# Patient Record
Sex: MALE | Race: WHITE | NOT HISPANIC OR LATINO | Employment: UNEMPLOYED | URBAN - METROPOLITAN AREA
[De-identification: names, ages, dates, MRNs, and addresses within clinical notes are randomized per-mention and may not be internally consistent; named-entity substitution may affect disease eponyms.]

---

## 2019-09-14 ENCOUNTER — HOSPITAL ENCOUNTER (EMERGENCY)
Facility: MEDICAL CENTER | Age: 44
End: 2019-09-14
Attending: EMERGENCY MEDICINE
Payer: MEDICAID

## 2019-09-14 VITALS
BODY MASS INDEX: 23.08 KG/M2 | WEIGHT: 199.52 LBS | DIASTOLIC BLOOD PRESSURE: 68 MMHG | TEMPERATURE: 96.8 F | SYSTOLIC BLOOD PRESSURE: 100 MMHG | RESPIRATION RATE: 14 BRPM | OXYGEN SATURATION: 96 % | HEART RATE: 81 BPM | HEIGHT: 78 IN

## 2019-09-14 DIAGNOSIS — T69.021A TRENCH FOOT OF RIGHT LOWER EXTREMITY, INITIAL ENCOUNTER: ICD-10-CM

## 2019-09-14 PROCEDURE — 99283 EMERGENCY DEPT VISIT LOW MDM: CPT

## 2019-09-14 RX ORDER — NYSTATIN 100000 [USP'U]/G
POWDER TOPICAL
Qty: 15 G | Refills: 3 | Status: SHIPPED | OUTPATIENT
Start: 2019-09-14

## 2019-09-14 RX ORDER — IBUPROFEN 800 MG/1
800 TABLET ORAL EVERY 8 HOURS PRN
Qty: 30 TAB | Refills: 0 | Status: SHIPPED | OUTPATIENT
Start: 2019-09-14

## 2019-09-14 NOTE — ED NOTES
Discharge instructions given to pt including follow up with pcp or returning if no improvement of symptoms or to return if worse. Prescription x 2 provided to pt. Questions answered by RN. Denies any new complaints. Discharged w/stable vitals and able to ambulate w/steady gait.

## 2019-09-14 NOTE — ED PROVIDER NOTES
ED Provider Note    CHIEF COMPLAINT  Chief Complaint   Patient presents with   • Foot Swelling     right foot, often wearing wet socks for long period of time       HPI  Rob Myers is a 44 y.o. male who presents for evaluation of right foot redness pain odor and swelling.  The patient works construction.  He apparently has been working in a cold wet environment especially in the morning.  He noticed the last 3 to 4 days his right foot is gotten progressively more malodorous, he says some skin sloughing and pain with swelling.  No other symptoms such as high fevers chills night sweats weight loss    REVIEW OF SYSTEMS  See HPI for further details.  No numbness tingling weakness fevers chills all other systems are negative.     PAST MEDICAL HISTORY  Past Medical History:   Diagnosis Date   • MRSA (methicillin resistant Staphylococcus aureus) infection 2009   • Drug abuse (HCC)     heroin, clean since july 2013   • Hernia        FAMILY HISTORY  Noncontributory    SOCIAL HISTORY  Social History     Socioeconomic History   • Marital status: Single     Spouse name: Not on file   • Number of children: Not on file   • Years of education: Not on file   • Highest education level: Not on file   Occupational History   • Not on file   Social Needs   • Financial resource strain: Not on file   • Food insecurity:     Worry: Not on file     Inability: Not on file   • Transportation needs:     Medical: Not on file     Non-medical: Not on file   Tobacco Use   • Smoking status: Current Every Day Smoker     Packs/day: 1.00     Years: 16.00     Pack years: 16.00     Types: Cigarettes   • Smokeless tobacco: Never Used   • Tobacco comment: 1ppd   Substance and Sexual Activity   • Alcohol use: No   • Drug use: No     Comment: clean since 2013   • Sexual activity: Not on file   Lifestyle   • Physical activity:     Days per week: Not on file     Minutes per session: Not on file   • Stress: Not on file   Relationships   • Social connections:     " Talks on phone: Not on file     Gets together: Not on file     Attends [employer] service: Not on file     Active member of club or organization: Not on file     Attends meetings of clubs or organizations: Not on file     Relationship status: Not on file   • Intimate partner violence:     Fear of current or ex partner: Not on file     Emotionally abused: Not on file     Physically abused: Not on file     Forced sexual activity: Not on file   Other Topics Concern   • Not on file   Social History Narrative   • Not on file     Former history of drug abuse  SURGICAL HISTORY  Past Surgical History:   Procedure Laterality Date   • INGUINAL HERNIA REPAIR  10/6/2013    Performed by Lauri Reed M.D. at SURGERY [facility]S       CURRENT MEDICATIONS  Home Medications     Reviewed by Karly Minor R.N. (Registered Nurse) on 09/14/19 at 1016  Med List Status: Complete   Medication Last Dose Status        Patient Sal Taking any Medications                       ALLERGIES  Allergies   Allergen Reactions   • Penicillins Anaphylaxis       PHYSICAL EXAM  VITAL SIGNS: /68   Pulse 81   Temp 36 °C (96.8 °F) (Temporal)   Resp 14   Ht 1.981 m (6' 6\")   Wt 90.5 kg (199 lb 8.3 oz)   SpO2 96%   BMI 23.06 kg/m²  Room air O2: 96    Constitutional: Well developed, Well nourished, No acute distress, Non-toxic appearance.   HENT: Normocephalic, Atraumatic, Bilateral external ears normal, Oropharynx moist, No oral exudates, Nose normal.   Eyes: PERRLA, EOMI, Conjunctiva normal, No discharge.   Neck: Normal range of motion, No tenderness, Supple, No stridor.   Cardiovascular: Normal heart rate, Normal rhythm, No murmurs, No rubs, No gallops.   Thorax & Lungs: Normal breath sounds, No respiratory distress, No wheezing, No chest tenderness.   Abdomen: Bowel sounds normal, Soft, No tenderness, No masses, No pulsatile masses.   Skin: Warm, Dry, No erythema, No rash.   Extremities: Right foot is erythematous with some skin " sloughing.  There is tenderness.  This is consistent with fungal trench foot  Neurologic: Alert & oriented x 3, Normal motor function, Normal sensory function, No focal deficits noted.   Psychiatric: Affect normal, Judgment normal, Mood normal.       COURSE & MEDICAL DECISION MAKING  Pertinent Labs & Imaging studies reviewed. (See chart for details)  Patient has classic history and physical exam suggesting fungal trench foot.  I counseled him on foot hygiene including wearing wool socks and drying his feet out at night.  I will prescribe him nystatin powder and high-dose ibuprofen    FINAL IMPRESSION  1.  Trench foot, right         Electronically signed by: Arturo Baron, 9/14/2019 10:34 AM

## 2019-09-14 NOTE — ED TRIAGE NOTES
Chief Complaint   Patient presents with   • Foot Swelling     right foot, often wearing wet socks for long period of time     Pt ambulated to triage with above complaint, redness between toes and bottom of foot . Pt said that he also wears socks while sleeping. +swelling foot and odor.